# Patient Record
Sex: MALE | Race: WHITE
[De-identification: names, ages, dates, MRNs, and addresses within clinical notes are randomized per-mention and may not be internally consistent; named-entity substitution may affect disease eponyms.]

---

## 2022-12-13 ENCOUNTER — HOSPITAL ENCOUNTER (INPATIENT)
Dept: HOSPITAL 95 - ER | Age: 77
LOS: 7 days | Discharge: NURSING, REHAB OR LONG-TERM CARE | DRG: 482 | End: 2022-12-20
Attending: HOSPITALIST | Admitting: HOSPITALIST
Payer: COMMERCIAL

## 2022-12-13 VITALS — WEIGHT: 191.01 LBS | BODY MASS INDEX: 27.35 KG/M2 | HEIGHT: 70 IN

## 2022-12-13 DIAGNOSIS — S09.90XA: ICD-10-CM

## 2022-12-13 DIAGNOSIS — S72.141A: Primary | ICD-10-CM

## 2022-12-13 DIAGNOSIS — Z23: ICD-10-CM

## 2022-12-13 DIAGNOSIS — E11.9: ICD-10-CM
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
## 2022-12-13 DIAGNOSIS — Z20.822: ICD-10-CM

## 2022-12-13 PROCEDURE — U0004 COV-19 TEST NON-CDC HGH THRU: HCPCS

## 2022-12-13 PROCEDURE — C1713 ANCHOR/SCREW BN/BN,TIS/BN: HCPCS

## 2022-12-13 PROCEDURE — 3E02340 INTRODUCTION OF INFLUENZA VACCINE INTO MUSCLE, PERCUTANEOUS APPROACH: ICD-10-PCS | Performed by: HOSPITALIST

## 2022-12-13 PROCEDURE — G0008 ADMIN INFLUENZA VIRUS VAC: HCPCS

## 2022-12-13 PROCEDURE — A9270 NON-COVERED ITEM OR SERVICE: HCPCS

## 2022-12-13 PROCEDURE — C1769 GUIDE WIRE: HCPCS

## 2022-12-14 LAB
ALBUMIN SERPL BCP-MCNC: 3.7 G/DL (ref 3.4–5)
ALBUMIN/GLOB SERPL: 1.1 {RATIO} (ref 0.8–1.8)
ALT SERPL W P-5'-P-CCNC: 26 U/L (ref 12–78)
ANION GAP SERPL CALCULATED.4IONS-SCNC: 10 MMOL/L (ref 6–16)
AST SERPL W P-5'-P-CCNC: 17 U/L (ref 12–37)
BASOPHILS # BLD AUTO: 0.03 K/MM3 (ref 0–0.23)
BASOPHILS NFR BLD AUTO: 0 % (ref 0–2)
BILIRUB SERPL-MCNC: 1.2 MG/DL (ref 0.1–1)
BUN SERPL-MCNC: 18 MG/DL (ref 8–24)
CALCIUM SERPL-MCNC: 8.8 MG/DL (ref 8.5–10.1)
CHLORIDE SERPL-SCNC: 106 MMOL/L (ref 98–108)
CO2 SERPL-SCNC: 25 MMOL/L (ref 21–32)
CREAT SERPL-MCNC: 0.94 MG/DL (ref 0.6–1.2)
DEPRECATED RDW RBC AUTO: 44 FL (ref 35.1–46.3)
EOSINOPHIL # BLD AUTO: 0.15 K/MM3 (ref 0–0.68)
EOSINOPHIL NFR BLD AUTO: 2 % (ref 0–6)
ERYTHROCYTE [DISTWIDTH] IN BLOOD BY AUTOMATED COUNT: 13.6 % (ref 11.7–14.2)
GLOBULIN SER CALC-MCNC: 3.3 G/DL (ref 2.2–4)
GLUCOSE SERPL-MCNC: 156 MG/DL (ref 70–99)
HCT VFR BLD AUTO: 42 % (ref 37–53)
HGB BLD-MCNC: 14.4 G/DL (ref 13.5–17.5)
IMM GRANULOCYTES # BLD AUTO: 0.06 K/MM3 (ref 0–0.1)
IMM GRANULOCYTES NFR BLD AUTO: 1 % (ref 0–1)
LYMPHOCYTES # BLD AUTO: 1.43 K/MM3 (ref 0.84–5.2)
LYMPHOCYTES NFR BLD AUTO: 18 % (ref 21–46)
MCHC RBC AUTO-ENTMCNC: 34.3 G/DL (ref 31.5–36.5)
MCV RBC AUTO: 89 FL (ref 80–100)
MONOCYTES # BLD AUTO: 0.69 K/MM3 (ref 0.16–1.47)
MONOCYTES NFR BLD AUTO: 9 % (ref 4–13)
NEUTROPHILS # BLD AUTO: 5.43 K/MM3 (ref 1.96–9.15)
NEUTROPHILS NFR BLD AUTO: 70 % (ref 41–73)
NRBC # BLD AUTO: 0 K/MM3 (ref 0–0.02)
NRBC BLD AUTO-RTO: 0 /100 WBC (ref 0–0.2)
PLATELET # BLD AUTO: 133 K/MM3 (ref 150–400)
POTASSIUM SERPL-SCNC: 3.8 MMOL/L (ref 3.5–5.5)
PROT SERPL-MCNC: 7 G/DL (ref 6.4–8.2)
PROTHROMBIN TIME: 10.8 SEC (ref 9.7–11.5)
SODIUM SERPL-SCNC: 141 MMOL/L (ref 136–145)

## 2022-12-14 PROCEDURE — 0QS634Z REPOSITION RIGHT UPPER FEMUR WITH INTERNAL FIXATION DEVICE, PERCUTANEOUS APPROACH: ICD-10-PCS | Performed by: ORTHOPAEDIC SURGERY

## 2022-12-14 NOTE — NUR
Spiritual Care | Pt. Request
Pt. is awake in bed and welcomes my visit. Pt. has just been given his dinner,
and is still feeling the effects of post-op recovery. We both agree that it
would be best for me to visit in the morning. Pt. verbalized gratitude for the
spiritual care visit.

## 2022-12-14 NOTE — NUR
SHIFT SUMMARY
PATIENT NEW ADMIT TO UNIT FROM ER FOR R HIP FX. WENT TO OR WITH DR PEGUERO FOR
RIGHT HIP PINNING. RETURNED TO ROOM AT 1730. SOME DISORIENTATION POST OP. 2
AQUACELS C/D/I TO RIGHT HIP. RATES PAIN 3/10. TOLERATING PO INTAKE. SALINE
LOCKED. NO VOID AT THIS TIME. RESTING IN BED. REPORT GIVEN TO NIGHT SHIFT RN.

## 2022-12-15 LAB
DEPRECATED RDW RBC AUTO: 44.6 FL (ref 35.1–46.3)
ERYTHROCYTE [DISTWIDTH] IN BLOOD BY AUTOMATED COUNT: 13.6 % (ref 11.7–14.2)
HCT VFR BLD AUTO: 36.7 % (ref 37–53)
HGB BLD-MCNC: 12.5 G/DL (ref 13.5–17.5)
MCHC RBC AUTO-ENTMCNC: 34.1 G/DL (ref 31.5–36.5)
MCV RBC AUTO: 90 FL (ref 80–100)
NRBC # BLD AUTO: 0 K/MM3 (ref 0–0.02)
NRBC BLD AUTO-RTO: 0 /100 WBC (ref 0–0.2)
PLATELET # BLD AUTO: 117 K/MM3 (ref 150–400)

## 2022-12-15 NOTE — NUR
SHIFT SUMMARY
PT POD #1 FOR R HIP PINNING. 2 AQUACEL DRESSING CDI, MIDDLE DRESSING WITH SOME
SHADOWING. PT UP WITH A 2 MAX ASSIST. PT AWAITING SNF PLACEMENT BUT PREFERS A
SNF IN THE [place]. VSS.

## 2022-12-16 LAB
DEPRECATED RDW RBC AUTO: 44.9 FL (ref 35.1–46.3)
ERYTHROCYTE [DISTWIDTH] IN BLOOD BY AUTOMATED COUNT: 13.9 % (ref 11.7–14.2)
HCT VFR BLD AUTO: 33.8 % (ref 37–53)
HGB BLD-MCNC: 11.7 G/DL (ref 13.5–17.5)
MCHC RBC AUTO-ENTMCNC: 34.6 G/DL (ref 31.5–36.5)
MCV RBC AUTO: 90 FL (ref 80–100)
NRBC # BLD AUTO: 0 K/MM3 (ref 0–0.02)
NRBC BLD AUTO-RTO: 0 /100 WBC (ref 0–0.2)
PLATELET # BLD AUTO: 117 K/MM3 (ref 150–400)
SARS-COV-2 RNA RESP QL NAA+PROBE: NEGATIVE

## 2022-12-16 NOTE — NUR
NIGHT SHIFT SUMMARY
NO ACUTE CHANGES THIS SHIFT. PT POD 2 FOR R HIP REPAIR. AQUACEL DRESSING TO R
HIP X3 WITH SMALL AMOUNT OF OLD DRAINAGE NOTED. PT HAS DENIED PAIN TONIGHT.
SITS UP IN BED AND DOES SOME ROM WITH HIS LEGS. VSS, WILL CONTINUE TO MONITOR.

## 2022-12-16 NOTE — NUR
SHIFT SUMMARY:
POD 2 RIGHT HIP PINNING
NO SIGNIFICANT CHANGES. PATIENT HAS 3 SMALL AQUACELS WITH A SMALL AMOUNT OF
DRAINAGE NOTED BUT ARE INTACT. HE IS ABLE TO MOVE ALL FINGERS AND TOES WHEN
ASKED. DENIES NUMBNESS OR TINGLING. PAIN IS MANAGED WITH PO OXY AND LYRICA.
HE IS TOLERATING PO INTAKE AND IS VOIDING.
CALLS APPROPRIATELY. CALL LIGHT WITHIN REACH.

## 2022-12-17 NOTE — NUR
SUMMARY
NO ACUTE CHANGES SINCE ASSUMPTION OF CARE AT 0700. PATIENT DECLINES TO BE UP
IN CHAIR FOR MEALS. DENIES PAIN AND NEED FOR PAIN MEDICATION. C/O "SCRATCH TO
BACK", ABRAISION PRESENT ABOUT 1 INCH IN LENGTH ALONG SPINE, MEPILEX PLACED.
PATIENT EATING & DRINKNING WELL. X3 AQUACELS TO RIGHT HIP. CALLS
APPROPRIATELY. REPORT GIVEN TO VENTURA SANTOS.

## 2022-12-17 NOTE — NUR
SHIFT SUMMARY:
POD 3 RIGHT HIP PINNING
NO SIGNIFICANT CHANGES. PAIN IS MANAGED WITH PO OXY AND LYRICA. RIGHT HIP HAS
3 AQUACELS THAT ARE C/D/I. DENIES NUMBNESS OR TINGLING IN ALL EXTREMITIES. HE
IS ABLE TO MOVE FINGERS AND TOES. HE IS VOIDING/PASSING GAS AS WELL AS
TOLERATING PO INTAKE. CALLS APPROPRIATELY. CALL LIGHT WITHIN REACH.

## 2022-12-17 NOTE — NUR
NIGHT SHIFT SUMMARY
NO ACUTE CHANGES THIS SHIFT. PT AAOX4 AND PLEASANT. DENIES PAIN WHILE RESTING
IN BED. REPORTS ONLY HAS PAIN WHEN ATTEMPTING TO GET OUT OF BED. AQUACEL
DRESSINGS TO R HIP UNCHANGED THROUGHOUT SHIFT. PT WAITING SNF PLACEMENT IN
[place] AS EARLY AS MONDAY. VSS, WILL CONTINUE TO MONITOR.

## 2022-12-18 NOTE — NUR
SHIFT SUMMARY
POD 4 R HIP PINNING. PT DENIES PAIN DURING SHIFT. NEEDS ENCOURAGEMENT TO GET
UP AN AMBULATE. BLANCHABLE REDNESS TO COCCYX, MEPILEX APPLIED. PT TOLERATING
PO WELL, PLAN IS TO DISCHARGE TO SNF TOMORROW.

## 2022-12-18 NOTE — NUR
SHIFT SUMMARY
POD#4 RIGHT HIP GAMMA NAIL. AAOX4. PT REPORTING MINIMAL DISCOMFORT WHILE IN
BED, DENIES PAIN MEDICATION. NO NAUSEA/EMESIS. DRESSING TO RIGHT HIP SCANT
AMOUNT RED DRAINAGE ALONG INCISION, NO INCREASE IN DRAINAGE THIS SHIFT. PT
VOIDING IN URINAL, SCANT AMOUNT OF URINE IN DEPENDS, CHANGED AT BEGINNING OF
SHIFT. NO ACUTE CHANGES OVER NIGHT. VSS. PT RESTING WELL THIS AM WITH CALL
LIGHT IN REACH.

## 2022-12-19 NOTE — NUR
POD5 FOR A RIGHT HIP PINNING. CIRCULATION AND SENSATION REMAINS INTACT IN RLE.
AQUACEL DRESSINGS CHANGES, INCISIONS CLEANED WITH WOUND SPRAY. VSS. PT SLEPT
LITTLE T/O THE NIGHT D/T PLAYING ON A TABLET. MEDICATED FOR PAIN WITH OXY AND
TYLENOL W/ GOOD RESULTS. PT TOLLERATING BEING REPOSITIONED T/O THE NIGHT FOR
PRESSURE ULCER PREVENTION. VOIDING AND PASSING GAS W/O DIFFICULTY,
TOLLERATING PO INTAKE W/O N/V. PLAN FOR PT TO CONTINUE TO WORK WITH PT UNTIL
HE CAN D/C TO SNF IN [place]. THE PATIENT IS CURRENTLY SLEEPING, IN NO
DISTRESS, CALL LIGHT IN REACH

## 2022-12-19 NOTE — NUR
SHIFT SUMMARY
PATIENT ALERT WHEN AWAKE, FATIGUED AND TAKES FREQUENT NAPS. GENERALLY WEAK.
ABLE TO STAND FOR SHORT PERIODS OF TIME AT [facility] WITH PHYSICAL THERAPIST.
ORTHOSTATIC AND LIGHTHEADED. MEDICATED FOR PAIN PRN. TOLERATING ADA DIET AND
LIQUIDS. INCONTINENT OF BLADDER AND BOWEL. ATTENDS CHANGED PRN. MEPILEX TO
SPINE AND COCCYX CHANGED THIS SHIFT. AQUACELS TO RIGHT HIP C/D/I. SPOUSE AND
DAUGHTER VISITED DURING AFTERNOON. PLAN IS TO DISCHARGE TO SNF IN [place], [place] TOMORROW 12/20/22 AM.

## 2022-12-20 LAB — SARS-COV-2 RNA RESP QL NAA+PROBE: NEGATIVE

## 2022-12-20 NOTE — NUR
SUMMARY
PT HAS PLANS FOR DISCHARGE WITH FAMILY TO CARE FACILITY CLOSER TO THEIR HOME.
TOLERATING PO,VERB PAIN ADEQUATELY CONTROLLED,VOIDING PER URINAL,HAD BM.CIRC
CHECKS INTACT.

## 2022-12-20 NOTE — NUR
DISCHARGE SUMMARY
PATIENT ALERT AND ORIENTED. TOLERATING ADA DIET AND LIQUIDS. OCC INC OF
BLADDER AND BOWEL. ATTENDS CHANGED PRN. 2 PERSON MOD ASSIST WITH FWW AND GAIT
BELT TO TRANSFER FROM BED TO CHAIR. MEPILEX IN PLACE TO MID SPINE AND SACRUM.
AQUACELS IN PLACE TO RIGHT HIP C/D/I. PAIN CONTROLLED WITH PO PAIN MEDS PRN.
FACILITY DISCHARGE ORDERS GIVEN. REPORT CALLED TO SNF IN [place], [place].
PATIENT DRESSED AND ASSISTED INTO PRIVATE VEHICLE. LEFT [facility] AT 1030 WITH
FAMILY.